# Patient Record
(demographics unavailable — no encounter records)

---

## 2024-10-17 NOTE — HISTORY OF PRESENT ILLNESS
[FreeTextEntry1] : 24yo transgender female assigned male at birth (Emmett, she/her) presenting for discussion on breast augmentation. She states she currently wears a size 36B bra and wishes to be a D with a rounder shape. She has been on feminizing hormones for 2 years. She has not noticed any additional breast growth in the last several months. She denies any history of silicone injections to the breasts. She denies any lumps, bumps, or other recent changes in her breasts. She has not had any breast imaging. She denies a family history of melanoma, breast, ovarian, or pancreatic cancer. She denies any family or personal history of blood clots. She states that nipple sensation is somewhat important to her (3/5 importance). She has no plans to breast or chest feed.  Patient works as a  at [employer]. Lives with partner who will be supportive and helpful after surgery. Denies tobacco, alcohol, and any other recreational drug use. Patient denies any history of psychiatric hospitalization or ER admission.

## 2024-10-17 NOTE — PHYSICAL EXAM
[de-identified] : NAD. BMI 30.1 [de-identified] : Normal respiratory effort. [de-identified] : Breast exam was performed with a medical assistant chaperone present (TERRI). No dominant masses, skin changes, nipple retraction, or palpable axillary lymphadenopathy. SN->N distance is 28 cm on the left and 28 cm on the right; width is 17 cm on the left and 17.5 cm on the right; N->IMF is 9.5 cm on the left and 9.5 cm on the right.

## 2024-10-17 NOTE — ASSESSMENT
[FreeTextEntry1] : The patient is specifically interested breast augmentation. She is debating between silicone and saline implants.  If she is interested in proceeding, we will need 1 mental health letter of assessment and another appointment for review of the patient decision checklist. She will also need to try of bra sizers at the next visit.  I, Dr. Joe, personally performed the evaluation and management (E/M) services for this new patient. That E/M includes conducting the clinically appropriate initial history &/or exam, assessing all conditions, and establishing the plan of care. Today, my SONALI, Evelio Davey PA-C, was here to observe my evaluation and management service for this patient & follow plan of care established by me going forward.

## 2024-10-17 NOTE — ADDENDUM
[FreeTextEntry1] : 2024-10-17 The patient has a mental health letter of assessment from Mahamed Smith, PhD. We are awaiting her follow-up appointment to review her decision on implant type, to try on bra sizers, and to review the patient decision checklist.

## 2024-10-17 NOTE — PHYSICAL EXAM
[de-identified] : NAD. BMI 30.1 [de-identified] : Normal respiratory effort. [de-identified] : Breast exam was performed with a medical assistant chaperone present (TERRI). No dominant masses, skin changes, nipple retraction, or palpable axillary lymphadenopathy. SN->N distance is 28 cm on the left and 28 cm on the right; width is 17 cm on the left and 17.5 cm on the right; N->IMF is 9.5 cm on the left and 9.5 cm on the right.

## 2024-10-17 NOTE — HISTORY OF PRESENT ILLNESS
[FreeTextEntry1] : 24yo transgender female assigned male at birth (Emmett, she/her) presenting for discussion on breast augmentation. She states she currently wears a size 36B bra and wishes to be a D with a rounder shape. She has been on feminizing hormones for 2 years. She has not noticed any additional breast growth in the last several months. She denies any history of silicone injections to the breasts. She denies any lumps, bumps, or other recent changes in her breasts. She has not had any breast imaging. She denies a family history of melanoma, breast, ovarian, or pancreatic cancer. She denies any family or personal history of blood clots. She states that nipple sensation is somewhat important to her (3/5 importance). She has no plans to breast or chest feed.  Patient works as a  at [employer] Lives with partner who will be supportive and helpful after surgery. Denies tobacco, alcohol, and any other recreational drug use. Patient denies any history of psychiatric hospitalization or ER admission.

## 2024-10-31 NOTE — ASSESSMENT
[FreeTextEntry1] : After review of implant sizers, the patient is a candidate for silicone implants for augmentation at this time. She is interested in 550-650 cc SS* style implants. Due to the patient body habitus, subfascial placement is recommended.  The patient has a mental health letter of assessment from Mahamed Smith, PhD. We reviewed, in detail the patient decision checklist for Natrelle silicone filled implants. The patient expressed that they had no other questions. We will submit for insurance authorization.  I, Dr. Joe, personally performed the evaluation and management (E/M) services for this established patient who presents today with continued gender dysphoria. That E/M includes conducting the clinically appropriate interval history &/or exam, assessing all new/exacerbated conditions, and establishing a new plan of care. Today, my SONALI, Evelio Davey PA-C, was here to observe my evaluation and management service for this continued condition and follow the plan of care established by me going forward.

## 2024-10-31 NOTE — HISTORY OF PRESENT ILLNESS
[FreeTextEntry1] : The patient returns for implant sizing and discussion of the patient decision checklist and to try on sizers.

## 2024-10-31 NOTE — REASON FOR VISIT
[Follow-Up: _____] : a [unfilled] follow-up visit [FreeTextEntry1] : further discussion for breast augmentation

## 2024-11-04 NOTE — DISCUSSION/SUMMARY
[FreeTextEntry1] : This alexander patient began transitioning since Oct 2022. She has been on hormonal therapy consistently Oct 2022. She has been in therapy and was diagnosed with Gender Dysphoria concerned about certain facial features that appear masculine and cause bullying desires facial feminization surgery followed by Transgender team. The following facial features appear masculine and will need to be modified: -Brow (behind hairline) -Nose -Jawline -Neck -cheeks   Allergies: - None   Meds: - Estradiol valerate IM - Progesterone - Adderall - Dutasteride  - Spironolactone  PMHx: - PTSD - Depression - Anxiety   Surgical History Denies past surgical history.  Admits to previous botox in 2024 to [facility]. Denies fillers or silicone injections.   SH: Denies marijuana use, Denies Cigarette use   ROS: General health / Constitutional: no fever, no chills, no unusual weight changes, no energy level changes, no night sweats Skin: No color or pigmentation changes, no pruritis, no rashes, no ulcers, Hair: No changes in color, texture, distribution, loss Nails: No color changes, brittleness, infection Head: No headaches, no new jaw pain Eyes: Good visual acuity, no color blindness, no corrective lenses, no photophobia, no diplopia, no blurred vision, no infection, pain, no medications, Ear: no tinnitus, no discharge, no pain, no medications Nose: no epistaxis, no rhinorrhea, no rhinitis, no pain, Mouth & Throat: no gingivitis, no gingival bleeding, no ulcers, no voice changes, no changes in oral mucosa or tongue Neck no stiffness, no pain, no lymphadenitis, no thyroid disorders, Respiratory: no cough, no dyspnea, no wheezing, no chest pain, cyanosis, no pneumonia, no asthma, Cardiovascular: no chest pain, no palpitations, no irregular rhythm, no tachycardia, no bradycardia, no heart failure, no dyspnea on exertion (GALLARDO), no edema Gastrointestinal: no nausea / vomiting, no dysphagia, no reflux / GERD, no abdominal pain, no jaundice Musculoskeletal: no pain in muscles, bones, or joints; no fractures, no dislocations, no muscular weakness, no atrophy Neurological: no paresis, no paralysis, no paresthesia, no seizures, no dizziness, no syncope, no ataxia, no tremor Psychological: no childhood behavioral problems, no irritability, no sleep changes Hematological: no anemia, no bruising, no bleeding tendencies,   PHYSICAL EXAM General: WDWN, in no distress, A & O x 3 (person, place, time) HEENT Head: AT/NC (atraumatic, normocephalic), including TMJ, sensory and motor; + Prominent brow and lateral orbital rim type III Eyes: EOMI, PERRLA Ears: exterior, nl hearing, Nose: + slightly wide, bulbous nasal tip with acute nasolabial angle intranasal exam showed enlarged turbinates and deviated caudal septum Throat & mouth: gd palate elevation, nl tongue mobility, nl tonsil size; + Prominent mandibular angle with active masseter, boxy wide chin, Hypoplastic cheeks. Neck: no masses, nl pulses, no prominent tracheal bulge ("Gavin's Apple"), excess submental fat.   First stage FFS: 21139: Frontal sinus anterior wall setback 33720: Orbital reconstruction bilateral 18162: Hairline lowering 51240: Browlift bilateral 18112: Supraorbital bar recontouring bilateral 72912: Tarsorrhaphy bilateral 40882: Mandibular angle contouring 94387-84: Mandibular angle osteotomy 35580: Osseous genioplasty narrowing, shortening 13994: Rhinoplasty open 46640: Submucous resection of septum 86203: Cartilage grafting for nasal reconstruction, use of cadaveric cartilage for  grafts, columellar strut, tip graft 84296: Submental fat excision 14318: platysmaplasty 48943: Fat grafting to malar regions bilateral from abdomen first 25 ccs 26658-29: Submandibular gland resection       21139 (Frontal sinus setback): Previous exposure to testosterone has led this patient to have a male appearing forehead with a more bossed shaped; this is opposed to a female appearing forehead that is more flat. A frontal sinus setback procedure will reshape the anterior wall of the frontal sinus by pushing back the bone and change the contour from convex (male-shape) to flat (female-shape). This surgical change will create a more flattened feminine brow appearance.          64995 (hairline lowering) and 65186 (Browlift): Previous exposure to testosterone has led to the patient having a male M-shaped hairline as opposed to a female upside-down U-shaped hairline. Her receded hairline also creates a high, broad male appearing forehead. Her low set eyebrows on top of her orbital roof rim give her a quick, male-appearing look. Both of these male traits: a high hairline and low-set brows are causing her intense feelings of dysphoria. She would benefit from bilateral browlift and hairline lowering procedures. Raising her lateral eyebrow with a bilateral browlift will create a more female appearance. She has stressed a strong desire to wear her own natural hair and does not want to always have to wear a wig to cover up her male features.          21256 (bilateral orbital reconstruction): The bone growth that occurred during testosterone exposure in the upper half of each orbital has caused this patient to have male appearing orbital features that contribute to the sense of dysphoria she feels in public. Orbital reconstruction with a reciprocating saw for an L-shaped ostectomy, on both sides will help remove the excessive bony protrusion; this will be followed by bone material grafting and resorbable plate fixation. The bilateral orbital reconstruction will help alleviate these orbital and upper facial male features.          21172 (Supraorbital bar contouring): This patient has orbital lateral hooding or overhang of her lateral frontal bone which is typically associated with the male skull and orbits. Supraorbital contouring of this lateral orbital region with a pineapple randall will correct this male feature that is causing this patient dysphoria. These procedures also allows us to do a success browlift procedure since the overhang of bone can inhibit the upper movement of the brow and the removal of this allows for an effective lift.          71709 (Tarsorrhaphy): Bilateral tarsorrhaphy or surgical closure of both eyelids, after protective lacrilube or perilube ointment is applied, is necessary for the safety of the patients globes. With the brow reshaping and browlift procedure the upper eyelid will be pulled up so the globe will be exposed and unprotected during this long, proposed procedure. The tarsorrhaphies, allows both globes to be protected so the complications of corneal abrasions may be prevented.          05449-70 (mandibular angle osteotomy) and 21209 (mandibular angle contouring): This patient has an angular, more boxy jaw which results in male appearing lower face. She also has increased lower facial width related to her mandibular angle lateral projection. Mandibular angle resection and contouring will create a more feminine triangular jaw. By having a mandibular angle reduction through both of these procedures, the lower facial width is narrowed and this will create a V-shaped, feminine appearance of the jawline when viewed from the front.           07726 (osseous genioplasty): This patient has a wide, large chin that contributes to her feelings of gender dysphoria and being mis-gendered in public.  The patient would benefit from an osseous genioplasty that narrows and shortens the chin. The osseous genioplasty will help create a more feminine and more, slender chin.          30045 (Rhinoplasty): This patients nose has characteristics of a male nose. The male nose is often larger and wider with a more bulbous nasal tip. These male nasal features make her feel masculine which exacerbates her gender dysphoria. A rhinoplasty would be beneficial to feminize her nose by creating a smaller nose and more delicate, softer nasal tip. The lateral dorsal shape will also be feminized by the rhinoplasty.          86543 (Submucous resection of nose): This patients nasal septum is shaped like a male septum. The septum is the supportive pole that holds up the structure of the nasal pyramid. In this case the septum will also be used to provide cartilage tissue necessary for nasal grafts. This septoplasty is required to modify the septum to create a straight nose with good functional breathing while taking away the characteristics of a male nose.          63724 (Cartilage grafting for nasal reconstruction): Cartilage grafting is crucial for the performance of the feminizing rhinoplasty. This patient has an ethnic type of nose. With regards to her nose, she wants to keep true to her ethnicity while appearing more feminine. To do that cartilage grafts including, bilateral  grafts, a columellar strut graft, a dorsal onlay graft, and nasal tip graft are all necessary.  The dorsal onlay graft will raise the nasal radix and improve the frontonasal regional shape.          30930 (Submental fat removal): Testosterone exposure will build fibrofatty tissue in the submental region that is not corrected by hormone therapy. This patient has this fibrofatty tissue in the submental region which has created a masculine lateral profile appearance. Direct submental fibrofatty tissue excision is necessary to correct this male feature.          14260 (Platysmaplasty): With prolonged testosterone exposure, the submental region will appear full and ptotic. This patient has a full and ptotic submental and neck region which is associated with a male-appearing neck. The female neck is slender and tight. The platsymaplasty, performed after submental fat excision, will help create a slender and tight, female appearing neck.          04120-78 (Submandibular gland resection): This patient has large submandibular glands in the submandibular region which has created a masculine lateral profile appearance. Direct resection of the submandibular glands is necessary to correct this male feature.         31502 (Fat grafting to malar facial regions): This patient had prolonged testosterone exposure resulting in male mid-face features with depressed soft tissues in the cheeks region. More fullness in the cheeks region may be created with fat grafting from the abdomen or hips to the cheek region creating a more full, feminine appearance. The Joyce fat grafting technique with atraumatic harvest and grafting leads to a 70%+ take of fat grafting to this region and is suggested. This procedure will correct the hypoplastic cheeks.   Needs a 3D CT scan and Virtual Surgical Planning. She will need to provide a letter from her therapist and hormone provider. Will need PCP clearance.   Patient seen in conjunction with Dr. Harjinder Mcnulty. Laura Andersen was an observer. Dr. Mcnulty did the entire examination, and evaluation.  We had a 60 minute meeting with the patient discussing diagnosis and medical management issues and outcomes.